# Patient Record
Sex: FEMALE | Race: WHITE | NOT HISPANIC OR LATINO | Employment: FULL TIME | URBAN - METROPOLITAN AREA
[De-identification: names, ages, dates, MRNs, and addresses within clinical notes are randomized per-mention and may not be internally consistent; named-entity substitution may affect disease eponyms.]

---

## 2021-03-05 ENCOUNTER — NON-PROVIDER VISIT (OUTPATIENT)
Dept: OCCUPATIONAL MEDICINE | Facility: CLINIC | Age: 31
End: 2021-03-05

## 2021-03-05 DIAGNOSIS — Z11.1 ENCOUNTER FOR PPD TEST: Primary | ICD-10-CM

## 2021-03-05 PROCEDURE — 86580 TB INTRADERMAL TEST: CPT | Performed by: NURSE PRACTITIONER

## 2021-03-08 ENCOUNTER — NON-PROVIDER VISIT (OUTPATIENT)
Dept: OCCUPATIONAL MEDICINE | Facility: CLINIC | Age: 31
End: 2021-03-08

## 2021-03-08 DIAGNOSIS — R76.11 POSITIVE PPD: ICD-10-CM

## 2021-03-08 LAB — TB WHEAL 3D P 5 TU DIAM: NORMAL MM

## 2021-03-08 NOTE — NON-PROVIDER
Patient was sent to [facility] for CXR, was explained that she needs to come back for her clearance and her copies once she gets the imaging report. Patient understood and left w/o any questions.

## 2021-05-26 ENCOUNTER — APPOINTMENT (OUTPATIENT)
Dept: RADIOLOGY | Facility: IMAGING CENTER | Age: 31
End: 2021-05-26
Attending: PHYSICIAN ASSISTANT
Payer: COMMERCIAL

## 2021-05-26 ENCOUNTER — OCCUPATIONAL MEDICINE (OUTPATIENT)
Dept: URGENT CARE | Facility: PHYSICIAN GROUP | Age: 31
End: 2021-05-26
Payer: COMMERCIAL

## 2021-05-26 VITALS
HEART RATE: 90 BPM | BODY MASS INDEX: 29.77 KG/M2 | TEMPERATURE: 97.7 F | OXYGEN SATURATION: 99 % | HEIGHT: 63 IN | DIASTOLIC BLOOD PRESSURE: 74 MMHG | SYSTOLIC BLOOD PRESSURE: 110 MMHG | RESPIRATION RATE: 18 BRPM | WEIGHT: 168 LBS

## 2021-05-26 DIAGNOSIS — S93.401A SPRAIN OF RIGHT ANKLE, UNSPECIFIED LIGAMENT, INITIAL ENCOUNTER: ICD-10-CM

## 2021-05-26 DIAGNOSIS — S99.911A INJURY OF RIGHT ANKLE, INITIAL ENCOUNTER: ICD-10-CM

## 2021-05-26 PROCEDURE — 73610 X-RAY EXAM OF ANKLE: CPT | Mod: TC,RT | Performed by: PHYSICIAN ASSISTANT

## 2021-05-26 PROCEDURE — 99203 OFFICE O/P NEW LOW 30 MIN: CPT | Performed by: PHYSICIAN ASSISTANT

## 2021-05-26 ASSESSMENT — ENCOUNTER SYMPTOMS
FEVER: 0
SENSORY CHANGE: 0
PALPITATIONS: 0
SHORTNESS OF BREATH: 0
BLURRED VISION: 0
TINGLING: 0
VOMITING: 0
CHILLS: 0
SORE THROAT: 0
NAUSEA: 0

## 2021-05-26 NOTE — LETTER
[facility]  [address] [place] [address]. [address] - [place] [address]  Phone:  [phone] - Fax:  [phone]   [facility] Progress Report and Disability Certification  Date of Service: 5/26/2021   No Show:  No  Date / Time of Next Visit: 6/1/2021   Claim Information   Patient Name: Shahid Stock  Claim Number:     Employer:   [employer]  Date of Injury: 5/24/2021     Insurer / TPA:   [employer] / Shanna Echevarria  ID / SSN: [id number]   Occupation:   Diagnosis: Diagnoses of Injury of right ankle, initial encounter and Sprain of right ankle, unspecified ligament, initial encounter were pertinent to this visit.    Medical Information   Related to Industrial Injury? Yes    Subjective Complaints:  DOI 5/24/2021:    Shahid Stock is a 30 y.o. female who presents today for evaluation of right ankle/foot pain.  2 days ago patient was carrying a box in her arms.  She did not see another box that was in front of her and she tripped over it falling to the ground.  As she fell her right foot twisted underneath her.  She has had pain and swelling of her right foot/ankle since that time.  Pain is worse with ambulation and if she stands for more than 20 minutes at a time.  She has not had any numbness or tingling.  Denies any previous injury.  She has used OTC analgesics and has occasionally used crutches but symptoms have not improved.     Objective Findings: Musculoskeletal:      Right ankle: Swelling and ecchymosis present. No deformity. Tenderness present over the lateral malleolus, ATF ligament, CF ligament and base of 5th metatarsal. No medial malleolus or proximal fibula tenderness. Decreased range of motion.      Right Achilles Tendon: Normal.      Comments: DP pulses are 2+ and symmetric bilaterally.  Sensation intact.  Cap refill less than 2 seconds.  5/5 strength with resisted motions but this does cause pain.  Antalgic gait.      Pre-Existing  Condition(s):     Assessment:   Initial Visit    Status: Additional Care Required  Permanent Disability:No    Plan:      Diagnostics: X-ray    Comments:       Disability Information   Status: Released to Restricted Duty    From:  2021  Through: 2021 Restrictions are: Temporary   Physical Restrictions   Sitting:    Standin hrs/day Stooping:    Bending:      Squatting:    Walkin hrs/day Climbing:    Pushing:      Pulling:    Other:    Reaching Above Shoulder (L):   Reaching Above Shoulder (R):       Reaching Below Shoulder (L):    Reaching Below Shoulder (R):      Not to exceed Weight Limits   Carrying(hrs):   Weight Limit(lb):   Lifting(hrs):   Weight  Limit(lb):     Comments: -Work restrictions: Patient must wear the walking boot while at work and should only do seated activities.  Return on Tuesday next week for reevaluation.  Return sooner for new or worsening symptoms.    Repetitive Actions   Hands: i.e. Fine Manipulations from Grasping:     Feet: i.e. Operating Foot Controls:     Driving / Operate Machinery:     Provider Name:   Anabelle Carlton P.A.-C. Physician Signature:  Physician Name:     Clinic Name / Location: [address]  [place] [address] Clinic Phone Number: Dept: [phone]   Appointment Time: 5:00 Pm Visit Start Time: 5:18 PM   Check-In Time:  5:15 Pm Visit Discharge Time:  6:36PM   Original-Treating Physician or Chiropractor    Page 2-Insurer/TPA    Page 3-Employer    Page 4-Employee

## 2021-05-26 NOTE — LETTER
"EMPLOYEE’S CLAIM FOR COMPENSATION/ REPORT OF INITIAL TREATMENT  FORM C-4    EMPLOYEE’S CLAIM - PROVIDE ALL INFORMATION REQUESTED   First Name  Shahid Last Name  Dayami Birthdate                    1990                Sex  female Claim Number   Home Address  [address] Age  30 y.o. Height  1.6 m (5' 3\") Weight  76.2 kg (168 lb) [facility]     [facility] Zip  [address] Telephone  [phone] (home)    Mailing Address  [address] [place]  [address] Primary Language Spoken  English    Insurer   Third Party    [employer] / [employer]  Employee's Occupation (Job Title) When Injury or Occupational Disease Occurred      Employer's Name    [employer] Telephone  [phone]    Employer Address  [address] [place]  [address]    Date of Injury  5/24/2021               Hour of Injury  4:20 PM Date Employer Notified  5/24/2021 Last Day of Work after Injury     or Occupational Disease  5/24/2021 Supervisor to Whom Injury     Reported  /[facility]   Address or Location of Accident (if applicable)  Work [1]   What were you doing at the time of accident? (if applicable)  Aranging Boxes    How did this injury or occupational disease occur? (Be specific an answer in detail. Use additional sheet if necessary)  I Tripped Myself at the Box   If you believe that you have an occupational disease, when did you first have knowledge of the disability and it relationship to your employment?  None Witnesses to the Accident  [facility]/      Nature of Injury or Occupational Disease  Sprain  Part(s) of Body Injured or Affected  Foot (R), N/A, N/A    I certify that the above is true and correct to the best of my knowledge and that I have provided this information in order to obtain the benefits of [place] inclusive or [place]).  I hereby authorize any physician, chiropractor, surgeon, practitioner, or other person, any hospital, including [facility] or [facility] hospital, any medical service organization, any insurance company, or other institution or organization to release to each other, any medical or other information, including benefits paid or payable, pertinent to this injury or disease, except information relative to diagnosis, treatment and/or counseling for AIDS, psychological conditions, alcohol or controlled substances, for which I must give specific authorization.  A Photostat of this authorization shall be as valid as the original.     Date05/26/2021   [facility]    Employee’s Signature   THIS REPORT MUST BE COMPLETED AND MAILED WITHIN 3 WORKING DAYS OF TREATMENT   [facility]  Name of Facility  [place]   Date  5/26/2021 Diagnosis  (S99.911A) Injury of right ankle, initial encounter  (S93.401A) Sprain of right ankle, unspecified ligament, initial encounter Is there evidence the injured employee was under the              influence of alcohol and/or another controlled substance at the time of accident?   Hour  5:18 PM Description of Injury or Disease  Diagnoses of Injury of right ankle, initial encounter and Sprain of right ankle, unspecified ligament, initial encounter were pertinent to this visit. No   Treatment  - Tall walking boot was provided.  Patient already has crutches.  She was advised to be weightbearing as tolerated.  - Apply ice multiple times per day  - OTC NSAIDs  - Keep elevated as much as possible  Have you advised the patient to remain off work five days or     more? No   X-Ray Findings  Negative   If Yes   From Date  To Date      From information given by the employee, together with medical evidence, can you directly connect this injury or occupational disease as job incurred?  Yes If No Full Duty    No Modified  "Duty  Yes   Is additional medical care by a physician indicated?  Yes If Modified Duty, Specify any Limitations / Restrictions      Do you know of any previous injury or disease contributing to this condition or occupational disease?                            No   Date  5/26/2021 Print Doctor’s Name   Audrey Carlton P.A.-C. I certify the employer’s copy of  this form was mailed on:   Address  [address] [place] [address]. [address] Insurer’s Use Only     [facility] Zip  [address]    Provider’s Tax ID Number  [id number] Telephone  Dept: [phone]      e-AUDREY Castillo P.A.-C.  Signature:     Degree          ORIGINAL-TREATING PHYSICIAN OR CHIROPRACTOR    PAGE 2-INSURER/TPA    PAGE 3-EMPLOYER    PAGE 4-EMPLOYEE        Form C-4 (rev.10/07)           BRIEF DESCRIPTION OF RIGHTS AND BENEFITS  (Pursuant to [place] 616C.050)    Notice of Injury or Occupational Disease (Incident Report Form C-1): If an injury or occupational disease (OD) arises out of and in the course of employment, you must provide written notice to your employer as soon as practicable, but no later than 7 days after the accident or OD. Your employer shall maintain a sufficient supply of the required forms.    Claim for Compensation (Form C-4): If medical treatment is sought, the form C-4 is available at the place of initial treatment. A completed \"Claim for Compensation\" (Form C-4) must be filed within 90 days after an accident or OD. The treating physician or chiropractor must, within 3 working days after treatment, complete and mail to the employer, the employer's insurer and third-party , the Claim for Compensation.    Medical Treatment: If you require medical treatment for your on-the-job injury or OD, you may be required to select a physician or chiropractor from a list provided by your workers’ compensation insurer, if it has contracted with an Organization for Managed Care (MCO) or Preferred Provider Organization (PPO) or " providers of health care. If your employer has not entered into a contract with an MCO or PPO, you may select a physician or chiropractor from the Panel of Physicians and Chiropractors. Any medical costs related to your industrial injury or OD will be paid by your insurer.    Temporary Total Disability (TTD): If your doctor has certified that you are unable to work for a period of at least 5 consecutive days, or 5 cumulative days in a 20-day period, or places restrictions on you that your employer does not accommodate, you may be entitled to TTD compensation.    Temporary Partial Disability (TPD): If the wage you receive upon reemployment is less than the compensation for TTD to which you are entitled, the insurer may be required to pay you TPD compensation to make up the difference. TPD can only be paid for a maximum of 24 months.    Permanent Partial Disability (PPD): When your medical condition is stable and there is an indication of a PPD as a result of your injury or OD, within 30 days, your insurer must arrange for an evaluation by a rating physician or chiropractor to determine the degree of your PPD. The amount of your PPD award depends on the date of injury, the results of the PPD evaluation, your age and wage.    Permanent Total Disability (PTD): If you are medically certified by a treating physician or chiropractor as permanently and totally disabled and have been granted a PTD status by your insurer, you are entitled to receive monthly benefits not to exceed 66 2/3% of your average monthly wage. The amount of your PTD payments is subject to reduction if you previously received a lump-sum PPD award.    Vocational Rehabilitation Services: You may be eligible for vocational rehabilitation services if you are unable to return to the job due to a permanent physical impairment or permanent restrictions as a result of your injury or occupational disease.    Transportation and Per Lola Reimbursement: You may be  eligible for travel expenses and per suzanna associated with medical treatment.    Reopening: You may be able to reopen your claim if your condition worsens after claim closure.     Appeal Process: If you disagree with a written determination issued by the insurer or the insurer does not respond to your request, you may appeal to the Department of Administration, , by following the instructions contained in your determination letter. You must appeal the determination within 70 days from the date of the determination letter at [address], [address], [place] [address], or [address], [place], [place] [address]. If you disagree with the  decision, you may appeal to the Department of Administration, . You must file your appeal within 30 days from the date of the  decision letter at [address], [address], [place], [place] [address], or [address] [facility] [address] [address], [place], [place] [address]. If you disagree with a decision of an , you may file a petition for judicial review with the District Court. You must do so within 30 days of the Appeal Officer’s decision. You may be represented by an  at your own expense or you may contact the [facility] for possible representation.    [place]  for Injured Workers (NAIW): If you disagree with a  decision, you may request that NAIW represent you without charge at an  Hearing. For information regarding denial of benefits, you may contact the [facility] at: [address], [address], [place] [address], [phone], or [address] [address], [phone]    To File a Complaint with the Division: If you wish to file a complaint with the  of the Division of Industrial Relations (DIR),  please contact the Workers’ Compensation Section, [address], [facility] [address], [place]  [place], [place] [address], telephone [phone], or [address], [address], [place], [place] [address], telephone [phone].    For assistance with Workers’ Compensation Issues: You may contact the [facility] Office for Consumer Health Assistance, [address], [address], [place] [address], Toll Free 1-307.330.5693, Web site: http://[facility].[place].gov/Programs/GAGE E-mail: [email or web address].[email or web address].[facility]              __________________________________________________________________                                    _________________            Employee Name / Signature                                                                                                                            Date                                                                                                                                                                                                                              D-2 (rev. 10/20)

## 2021-05-27 NOTE — PROGRESS NOTES
"Subjective:      Shahid Stock is a 30 y.o. female who presents with Work-Related Injury (DOI 5/24/21, right foot possible sprain. patient tripped on a box while hold a box, she twisted her right ankle. )      DOI 5/24/2021:    Shahid Stock is a 30 y.o. female who presents today for evaluation of right ankle/foot pain.  2 days ago patient was carrying a box in her arms.  She did not see another box that was in front of her and she tripped over it falling to the ground.  As she fell her right foot twisted underneath her.  She has had pain and swelling of her right foot/ankle since that time.  Pain is worse with ambulation and if she stands for more than 20 minutes at a time.  She has not had any numbness or tingling.  Denies any previous injury.  She has used OTC analgesics and has occasionally used crutches but symptoms have not improved.        Review of Systems   Constitutional: Negative for chills and fever.   HENT: Negative for sore throat.    Eyes: Negative for blurred vision.   Respiratory: Negative for shortness of breath.    Cardiovascular: Negative for chest pain and palpitations.   Gastrointestinal: Negative for nausea and vomiting.   Musculoskeletal: Positive for joint pain (right ankle).   Neurological: Negative for tingling and sensory change.     PMH: No pertinent past medical history to this problem  MEDS: Medications were reviewed in Epic  ALLERGIES: Allergies were reviewed in Epic  FH: No pertinent family history to this problem         Objective:     /74   Pulse 90   Temp 36.5 °C (97.7 °F) (Temporal)   Resp 18   Ht 1.6 m (5' 3\")   Wt 76.2 kg (168 lb)   SpO2 99%   BMI 29.76 kg/m²      Physical Exam  Constitutional:       Appearance: She is well-developed.   HENT:      Head: Normocephalic and atraumatic.      Right Ear: External ear normal.      Left Ear: External ear normal.   Eyes:      Conjunctiva/sclera: Conjunctivae normal.      Pupils: Pupils are equal, round, and " reactive to light.   Cardiovascular:      Rate and Rhythm: Normal rate and regular rhythm.      Heart sounds: Normal heart sounds. No murmur heard.     Pulmonary:      Effort: Pulmonary effort is normal.      Breath sounds: Normal breath sounds. No wheezing.   Musculoskeletal:      Right ankle: Swelling and ecchymosis present. No deformity. Tenderness present over the lateral malleolus, ATF ligament, CF ligament and base of 5th metatarsal. No medial malleolus or proximal fibula tenderness. Decreased range of motion.      Right Achilles Tendon: Normal.      Comments: DP pulses are 2+ and symmetric bilaterally.  Sensation intact.  Cap refill less than 2 seconds.  5/5 strength with resisted motions but this does cause pain.  Antalgic gait.   Skin:     General: Skin is warm and dry.      Capillary Refill: Capillary refill takes less than 2 seconds.   Neurological:      Mental Status: She is alert and oriented to person, place, and time.   Psychiatric:         Behavior: Behavior normal.         Judgment: Judgment normal.       DX-ANKLE 3+ VIEWS RIGHT  FINDINGS:  The alignment of the ankle is normal.  There is no soft tissue swelling.  There is no evidence of displaced fracture or dislocation.        IMPRESSION:     Negative RIGHT ankle series.                     *X-rays were reviewed and interpreted independently by me. I agree with the radiologist's findings          Assessment/Plan:     1. Injury of right ankle, initial encounter  - DX-ANKLE 3+ VIEWS RIGHT; Future    2. Sprain of right ankle, unspecified ligament, initial encounter  - Tall walking boot was provided.  Patient already has crutches.  She was advised to be weightbearing as tolerated.  - Apply ice multiple times per day  - OTC NSAIDs  - Keep elevated as much as possible  -Work restrictions: Patient must wear the walking boot while at work and should only do seated activities.  Return on Tuesday next week for reevaluation.  Return sooner for new or  worsening symptoms.

## 2021-06-01 ENCOUNTER — OCCUPATIONAL MEDICINE (OUTPATIENT)
Dept: URGENT CARE | Facility: PHYSICIAN GROUP | Age: 31
End: 2021-06-01
Payer: COMMERCIAL

## 2021-06-01 VITALS
OXYGEN SATURATION: 97 % | HEART RATE: 107 BPM | WEIGHT: 168 LBS | SYSTOLIC BLOOD PRESSURE: 118 MMHG | DIASTOLIC BLOOD PRESSURE: 78 MMHG | HEIGHT: 63 IN | BODY MASS INDEX: 29.77 KG/M2 | RESPIRATION RATE: 16 BRPM | TEMPERATURE: 99.7 F

## 2021-06-01 DIAGNOSIS — S93.401D SPRAIN OF RIGHT ANKLE, UNSPECIFIED LIGAMENT, SUBSEQUENT ENCOUNTER: ICD-10-CM

## 2021-06-01 PROCEDURE — 99213 OFFICE O/P EST LOW 20 MIN: CPT | Performed by: NURSE PRACTITIONER

## 2021-06-01 ASSESSMENT — ENCOUNTER SYMPTOMS
NEUROLOGICAL NEGATIVE: 1
CONSTITUTIONAL NEGATIVE: 1

## 2021-06-01 ASSESSMENT — VISUAL ACUITY: OU: 1

## 2021-06-01 NOTE — PROGRESS NOTES
"Subjective:     Shahid Stock is a 30 y.o. female who presents for Ankle Injury (WC FV, R ankle injury, painful to walk, swelling, pt states its not feeling better )    \"DOI 5/24/2021:     Shahid Stock is a 30 y.o. female who presents today for evaluation of right ankle/foot pain.  2 days ago patient was carrying a box in her arms.  She did not see another box that was in front of her and she tripped over it falling to the ground.  As she fell her right foot twisted underneath her.  She has had pain and swelling of her right foot/ankle since that time.  Pain is worse with ambulation and if she stands for more than 20 minutes at a time.  She has not had any numbness or tingling.  Denies any previous injury.  She has used OTC analgesics and has occasionally used crutches but symptoms have not improved.\"    X-ray of right ankle negative. Tx included tall walking boot. Had crutches. RICE/NSAIDs. Restrictions.    Follow-up  visit today 6/1/2021:    Patient continues to report pain and tenderness at the front of her right ankle. Patient reports being able to walk lightly at home for up to 30 minutes. Has been using ibuprofen as needed. Has not been back to work yet.    Patient was screened prior to rooming and denied COVID-19 diagnosis or contact with a person who has been diagnosed or is suspected to have COVID-19. During this visit, appropriate PPE was worn, hand hygiene was performed, and the patient and any visitors were masked.    PMH: No pertinent past medical history to this problem  MEDS: Medications were reviewed in Epic  ALLERGIES: Allergies were reviewed in Epic  SOCHX: Works as a  at [employer]   FH: No pertinent family history to this problem    Review of Systems   Constitutional: Negative.    Musculoskeletal:        R ankle pain   Neurological: Negative.    All other systems reviewed and are negative.    Additional details per HPI.      Objective:     /78 (BP Location: Right arm, " "Patient Position: Sitting, BP Cuff Size: Adult)   Pulse (!) 107   Temp 37.6 °C (99.7 °F) (Temporal)   Resp 16   Ht 1.6 m (5' 3\")   Wt 76.2 kg (168 lb)   SpO2 97%   BMI 29.76 kg/m²     Physical Exam  Vitals reviewed.   Constitutional:       General: She is not in acute distress.     Appearance: She is well-developed. She is not ill-appearing or toxic-appearing.   HENT:      Head: Normocephalic.      Right Ear: External ear normal.      Left Ear: External ear normal.   Eyes:      General: Vision grossly intact.      Extraocular Movements: Extraocular movements intact.      Conjunctiva/sclera: Conjunctivae normal.   Cardiovascular:      Rate and Rhythm: Normal rate.      Pulses: Normal pulses.   Pulmonary:      Effort: Pulmonary effort is normal. No respiratory distress.   Musculoskeletal:         General: No deformity.      Cervical back: Normal range of motion.      Right ankle: Swelling present. No deformity, ecchymosis or lacerations. Tenderness present. Decreased range of motion.      Right foot: Normal range of motion and normal capillary refill. No swelling, deformity, laceration or tenderness.      Comments: Mild swelling, tenderness over anterolateral aspect of right ankle   Skin:     General: Skin is warm and dry.      Capillary Refill: Capillary refill takes less than 2 seconds.      Coloration: Skin is not pale.      Findings: No bruising, erythema or rash.   Neurological:      Mental Status: She is alert and oriented to person, place, and time.      Sensory: No sensory deficit.      Motor: No weakness.      Coordination: Coordination normal.      Gait: Gait abnormal (Mildly antalgic).   Psychiatric:         Behavior: Behavior normal. Behavior is cooperative.       Assessment/Plan:     1. Sprain of right ankle, unspecified ligament, subsequent encounter    Rest, ice, compression, and elevation (RICE) and over-the-counter acetaminophen alternating with ibuprofen, per 's instructions, as " needed for pain. Continue with walking boot/crutches as needed. Ambulate as tolerated. Work restrictions. Follow up in 5 days. If no significant improvement, consider referral to occupational medicine. Return sooner if symptoms change/worsen.    Differential diagnosis, natural history, supportive care, over-the-counter symptom management per 's instructions, close monitoring, and indications for immediate follow-up discussed.     Patient advised to: Return in about 5 days (around 6/6/2021).    All questions answered. Patient agrees with the plan of care.

## 2021-06-01 NOTE — LETTER
"   [facility] [address] - [place] [address]  Phone:  [phone] - Fax:  [phone]   [facility] Progress Report and Disability Certification  Date of Service: 6/1/2021   No Show:  No  Date / Time of Next Visit: 6/6/2021@ 9:00 AM   Claim Information   Patient Name: Shahid Stock  Claim Number:     Employer:   [employer] Date of Injury: 5/24/2021     Insurer / TPA: Andi [place]  ID / SSN:     Occupation:   Diagnosis: The encounter diagnosis was Sprain of right ankle, unspecified ligament, subsequent encounter.    Medical Information   Related to Industrial Injury? Yes    Subjective Complaints:  \"DOI 5/24/2021:     Shahid Stock is a 30 y.o. female who presents today for evaluation of right ankle/foot pain.  2 days ago patient was carrying a box in her arms.  She did not see another box that was in front of her and she tripped over it falling to the ground.  As she fell her right foot twisted underneath her.  She has had pain and swelling of her right foot/ankle since that time.  Pain is worse with ambulation and if she stands for more than 20 minutes at a time.  She has not had any numbness or tingling.  Denies any previous injury.  She has used OTC analgesics and has occasionally used crutches but symptoms have not improved.\"    X-ray of right ankle negative. Tx included tall walking boot. Had crutches. RICE/NSAIDs. Restrictions.    Follow-up  visit today 6/1/2021:    Patient continues to report pain and tenderness at the front of her right ankle. Patient reports being able to walk lightly at home for up to 30 minutes. Has been using ibuprofen as needed. Has not been back to work yet.   Objective Findings: Constitutional:       General: She is not in acute distress.     Appearance: She is well-developed. She is not ill-appearing or toxic-appearing.   Musculoskeletal:         General: No deformity.      Cervical back: " Normal range of motion.      Right ankle: Swelling present. No deformity, ecchymosis or lacerations. Tenderness present. Decreased range of motion.      Right foot: Normal range of motion and normal capillary refill. No swelling, deformity, laceration or tenderness.      Comments: Mild swelling, tenderness over anterolateral aspect of right ankle   Skin:     General: Skin is warm and dry.      Capillary Refill: Capillary refill takes less than 2 seconds.      Coloration: Skin is not pale.      Findings: No bruising, erythema or rash.   Neurological:      Mental Status: She is alert and oriented to person, place, and time.      Sensory: No sensory deficit.      Motor: No weakness.      Coordination: Coordination normal.      Gait: Gait abnormal (Mildly antalgic).   Pre-Existing Condition(s):     Assessment:     Comments:Slightly improved    Status: Additional Care Required  Permanent Disability:No    Plan:      Diagnostics:      Comments:  Rest, ice, compression, and elevation (RICE) and over-the-counter acetaminophen alternating with ibuprofen, per 's instructions, as needed for pain. Continue with walking boot/crutches as needed. Ambulate as tolerated. Work restrictions.   Follow up in 5 days. If no significant improvement, consider referral to occupational medicine. Return sooner if symptoms change/worsen.    Disability Information   Status: Released to Restricted Duty    From:  6/1/2021  Through: 6/6/2021 Restrictions are: Temporary   Physical Restrictions   Sitting:    Standing:  < or = to 1 hr/day Stooping:    Bending:      Squatting:    Walking:  < or = to 1 hr/day Climbing:    Pushing:      Pulling:    Other:    Reaching Above Shoulder (L):   Reaching Above Shoulder (R):       Reaching Below Shoulder (L):    Reaching Below Shoulder (R):      Not to exceed Weight Limits   Carrying(hrs):   Weight Limit(lb): < or = to 10 pounds Lifting(hrs):   Weight  Limit(lb): < or = to 10 pounds   Comments: Must  wear walking boot at work; prefer seated activities    Repetitive Actions   Hands: i.e. Fine Manipulations from Grasping:     Feet: i.e. Operating Foot Controls:     Driving / Operate Machinery:     Provider Name:   LICO Kaplan Physician Signature:  Physician Name:     Clinic Name / Location: [address]  [place] [address] Clinic Phone Number: Dept: [phone]   Appointment Time: 12:55 Pm Visit Start Time: 1:00 PM   Check-In Time:  12:55 Pm Visit Discharge Time: 1:25 PM   Original-Treating Physician or Chiropractor    Page 2-Insurer/TPA    Page 3-Employer    Page 4-Employee

## 2021-06-06 ENCOUNTER — OCCUPATIONAL MEDICINE (OUTPATIENT)
Dept: URGENT CARE | Facility: PHYSICIAN GROUP | Age: 31
End: 2021-06-06
Payer: COMMERCIAL

## 2021-06-06 VITALS
DIASTOLIC BLOOD PRESSURE: 72 MMHG | WEIGHT: 168 LBS | SYSTOLIC BLOOD PRESSURE: 116 MMHG | HEART RATE: 84 BPM | HEIGHT: 63 IN | TEMPERATURE: 97.4 F | BODY MASS INDEX: 29.77 KG/M2 | OXYGEN SATURATION: 98 % | RESPIRATION RATE: 16 BRPM

## 2021-06-06 DIAGNOSIS — S93.401D SPRAIN OF RIGHT ANKLE, UNSPECIFIED LIGAMENT, SUBSEQUENT ENCOUNTER: ICD-10-CM

## 2021-06-06 PROCEDURE — 99213 OFFICE O/P EST LOW 20 MIN: CPT | Performed by: PHYSICIAN ASSISTANT

## 2021-06-06 ASSESSMENT — ENCOUNTER SYMPTOMS
SENSORY CHANGE: 0
FOCAL WEAKNESS: 0
TINGLING: 0

## 2021-06-06 NOTE — LETTER
"   [facility] [address] - [place] [address]  Phone:  [phone] - Fax:  [phone]   [facility] Progress Report and Disability Certification  Date of Service: 6/6/2021   No Show:  No  Date / Time of Next Visit: 6/11/2021@9:20AM   Claim Information   Patient Name: Shahid Jenkins  Claim Number:     Employer:   [employer]  Date of Injury: 5/24/2021     Insurer / TPA: Andi [place]  ID / SSN:     Occupation:   Diagnosis: The encounter diagnosis was Sprain of right ankle, unspecified ligament, subsequent encounter.    Medical Information   Related to Industrial Injury? Yes    Subjective Complaints:  The following is cut and pasted from first visit for continuity of documentation:  \"DOI 5/24/2021:  Shahid Stock is a 30 y.o. female who presents today for evaluation of right ankle/foot pain.  2 days ago patient was carrying a box in her arms.  She did not see another box that was in front of her and she tripped over it falling to the ground.  As she fell her right foot twisted underneath her.  She has had pain and swelling of her right foot/ankle since that time.  Pain is worse with ambulation and if she stands for more than 20 minutes at a time.  She has not had any numbness or tingling.  Denies any previous injury.  She has used OTC analgesics and has occasionally used crutches but symptoms have not improved.\"     X-ray of right ankle negative. Tx included tall walking boot. Had crutches. RICE/NSAIDs. Restrictions.    This is patient 3rd visit for injury.  Pt states her foot and ankle are still sore and swollen, she continues to wear walking boot, RICE treatment and is taking otc ibuprofen \" without any change\" .  Pt has not yet returned to work, she states there is no light duty or seated work for her at her job at [employer].      Objective Findings: Right foot exam: Dorsum of foot is moderately swollen, lateral " malleolus moderately tender to palpation, lateral aspect of foot is tender to palpation, arch of foot is also tender to palpation.  No tenderness to palpation to medial malleolus.  Patient has patient has painful decreased range of motion.  Patient ambulates with antalgic gait.  Distal neurovascular intact.   Pre-Existing Condition(s):     Assessment:   Condition Same    Status: Additional Care Required  Permanent Disability:No    Plan:      Diagnostics:   Comments:xray on initial visit was negative    Comments:       Disability Information   Status:      From:  2021  Through: 2021 Restrictions are: Temporary   Physical Restrictions   Sitting:    Standing:  < or = to 1 hr/day Stooping:    Bending:      Squattin hrs/day Walking:  < or = to 1 hr/day Climbin hrs/day Pushing:      Pulling:    Other:    Reaching Above Shoulder (L):   Reaching Above Shoulder (R):       Reaching Below Shoulder (L):    Reaching Below Shoulder (R):      Not to exceed Weight Limits   Carrying(hrs):   Weight Limit(lb): < or = to 10 pounds Lifting(hrs):   Weight  Limit(lb): < or = to 10 pounds   Comments: Patient must wear walking boot while at work.  Prefer seated work.  Continue RICE treatment, over-the-counter NSAIDs as discussed.  Patient to continue doing gentle range of motion exercises several times daily.    Repetitive Actions   Hands: i.e. Fine Manipulations from Grasping:     Feet: i.e. Operating Foot Controls:     Driving / Operate Machinery:     Provider Name:   Hannah Acosta P.A.-C. Physician Signature:  Physician Name:     Clinic Name / Location: [address]  [place] [address] Clinic Phone Number: Dept: [phone]   Appointment Time: 9:45 Am Visit Start Time: 9:45 AM   Check-In Time:  9:44 Am Visit Discharge Time:     Original-Treating Physician or Chiropractor    Page 2-Insurer/TPA    Page 3-Employer    Page 4-Employee

## 2021-06-06 NOTE — PROGRESS NOTES
"Subjective:      Shahid Jenkins is a 30 y.o. female who presents with Ankle Injury (WC FV, Still in pain, swelling has gone down )    Medications:    • This patient does not have an active medication from one of the medication groupers.    Allergies: Patient has no known allergies.    Problem List: Shahid Jenkins does not have a problem list on file.    Surgical History:  No past surgical history on file.    Past Social Hx: Shahid Jenkins  reports that she has been smoking. She has never used smokeless tobacco. She reports that she does not drink alcohol and does not use drugs.     Past Family Hx:  Shahid Jenkins family history is not on file.     Problem list, medications, and allergies reviewed by myself today in Epic.    The following is cut and pasted from first visit for continuity of documentation:  \"DOI 5/24/2021:  Shahid Stock is a 30 y.o. female who presents today for evaluation of right ankle/foot pain.  2 days ago patient was carrying a box in her arms.  She did not see another box that was in front of her and she tripped over it falling to the ground.  As she fell her right foot twisted underneath her.  She has had pain and swelling of her right foot/ankle since that time.  Pain is worse with ambulation and if she stands for more than 20 minutes at a time.  She has not had any numbness or tingling.  Denies any previous injury.  She has used OTC analgesics and has occasionally used crutches but symptoms have not improved.\"     X-ray of right ankle negative. Tx included tall walking boot. Had crutches. RICE/NSAIDs. Restrictions.    This is patient 3rd visit for injury.  Pt states her foot and ankle are still sore and swollen, she continues to wear walking boot, RICE treatment and is taking otc ibuprofen \" without any change\" .  Pt has not yet returned to work, she states there is no light duty or seated work for her at her job at [employer].        Ankle Injury " "  Pertinent negatives include no tingling.       Review of Systems   Musculoskeletal: Positive for joint pain.   Skin: Negative for rash.   Neurological: Negative for tingling, sensory change and focal weakness.   All other systems reviewed and are negative.         Objective:     /72 (BP Location: Right arm, Patient Position: Sitting, BP Cuff Size: Adult)   Pulse 84   Temp 36.3 °C (97.4 °F) (Temporal)   Resp 16   Ht 1.6 m (5' 3\")   Wt 76.2 kg (168 lb)   SpO2 98%   BMI 29.76 kg/m²      Physical Exam  Vitals and nursing note reviewed.   Constitutional:       Appearance: Normal appearance. She is normal weight.   HENT:      Head: Normocephalic.      Nose: Nose normal.   Cardiovascular:      Rate and Rhythm: Normal rate.      Pulses: Normal pulses.   Pulmonary:      Effort: Pulmonary effort is normal.   Skin:     Capillary Refill: Capillary refill takes less than 2 seconds.   Neurological:      Mental Status: She is alert.         Right foot exam: Dorsum of foot is moderately swollen, lateral malleolus moderately tender to palpation, lateral aspect of foot is tender to palpation, arch of foot is also tender to palpation.  No tenderness to palpation to medial malleolus.  Patient has patient has painful decreased range of motion.  Patient ambulates with antalgic gait.  Distal neurovascular intact.       Assessment/Plan:         1. Sprain of right ankle, unspecified ligament, subsequent encounter       Pt to follow instructions on d-39.      RTC in 6 days as scheduled.     PT should follow up with PCP in 1-2 days for re-evaluation if symptoms have not improved.      Discussed red flags and reasons to return to UC or ED.      Pt and/or family verbalized understanding of diagnosis and follow up instructions and was offered informational handout on diagnosis.  PT discharged.     I have spent 30 minutes on the care of this patient.  This includes preparing for visit which includes review of previous visits if " available in EMR, obtaining HPI, exam and evaluation of patient, ordering and independent interpretation of labs, imaging, tests, medical management, counseling, education and documentation.

## 2021-06-11 ENCOUNTER — OCCUPATIONAL MEDICINE (OUTPATIENT)
Dept: URGENT CARE | Facility: PHYSICIAN GROUP | Age: 31
End: 2021-06-11
Payer: COMMERCIAL

## 2021-06-11 VITALS
HEART RATE: 101 BPM | TEMPERATURE: 97.7 F | WEIGHT: 168 LBS | DIASTOLIC BLOOD PRESSURE: 80 MMHG | HEIGHT: 63 IN | BODY MASS INDEX: 29.77 KG/M2 | SYSTOLIC BLOOD PRESSURE: 112 MMHG | OXYGEN SATURATION: 99 %

## 2021-06-11 DIAGNOSIS — S93.401D SPRAIN OF RIGHT ANKLE, UNSPECIFIED LIGAMENT, SUBSEQUENT ENCOUNTER: Primary | ICD-10-CM

## 2021-06-11 PROCEDURE — 99214 OFFICE O/P EST MOD 30 MIN: CPT | Performed by: PHYSICIAN ASSISTANT

## 2021-06-11 NOTE — LETTER
[address] - [place] [address]  Phone:  [phone] - Fax:  [phone]   [facility] Progress Report and Disability Certification  Date of Service: 6/11/2021   No Show:  No  Date / Time of Next Visit: 6/16/2021@9:00AM   Claim Information   Patient Name: Shahid Jenkins  Claim Number:     Employer:   [employer] Date of Injury: 5/24/2021     Insurer / TPA: Andi [place]  ID / SSN:     Occupation:   Diagnosis: The encounter diagnosis was Sprain of right ankle, unspecified ligament, subsequent encounter.    Medical Information   Related to Industrial Injury? Yes    Subjective Complaints:  Copied from initial visit - DOI 5/24/2021:     Shahid Stock is a 30 y.o. female who presents today for evaluation of right ankle/foot pain.  2 days ago patient was carrying a box in her arms.  She did not see another box that was in front of her and she tripped over it falling to the ground.  As she fell her right foot twisted underneath her.  She has had pain and swelling of her right foot/ankle since that time.  Pain is worse with ambulation and if she stands for more than 20 minutes at a time.  She has not had any numbness or tingling.  Denies any previous injury.  She has used OTC analgesics and has occasionally used crutches but symptoms have not improved.       F/u 6/11/21 visit #4- The pt return to work on Tuesday. She is taking 10-15 minutes breaks every 1-2 hours. Ankle pain has worsened since Tuesday. The pain is wore prolonged standing, walking. She has been wearing a non-weightbearing boot, elevating at rest and taking ibuprofen BID without improvement.    Objective Findings: Physical Exam  Vitals reviewed.   Constitutional:       General: She is not in acute distress.     Appearance: She is well-developed.   Neck:      Trachea: No tracheal deviation.   Musculoskeletal:      Right ankle: Tenderness present over the  lateral malleolus. No medial malleolus, base of 5th metatarsal or proximal fibula tenderness. Normal range of motion.      Right Achilles Tendon: Tenderness present. Clark's test negative.   Skin:     General: Skin is warm and dry.      Capillary Refill: Capillary refill takes less than 2 seconds.   Neurological:      Mental Status: She is alert and oriented to person, place, and time.   Psychiatric:         Mood and Affect: Mood normal.         Behavior: Behavior normal.    Pre-Existing Condition(s):     Assessment:   Condition Worsened    Status: Additional Care Required  Permanent Disability:No    Plan: Medication  Comments:ibuprofen TID     Diagnostics: X-ray  Comments:negative     Comments:       Disability Information   Status: Released to Restricted Duty    From:  6/11/2021  Through: 6/16/2021 Restrictions are: Temporary   Physical Restrictions   Sitting:    Standing:  < or = to 1 hr/day Stooping:    Bending:      Squatting:    Walking:  < or = to 1 hr/day Climbing:    Pushing:      Pulling:    Other:    Reaching Above Shoulder (L):   Reaching Above Shoulder (R):       Reaching Below Shoulder (L):    Reaching Below Shoulder (R):      Not to exceed Weight Limits   Carrying(hrs):   Weight Limit(lb):   Lifting(hrs):   Weight  Limit(lb):     Comments: Patient must wear walking boot while at work.  Prefer seated work.  Continue RICE treatment.   Patient to continue doing gentle range of motion exercises several times daily.   A referral to occ med was placed. Follow up in 5 days with occ med if unable to schedule she can return to .     Repetitive Actions   Hands: i.e. Fine Manipulations from Grasping:     Feet: i.e. Operating Foot Controls:     Driving / Operate Machinery:     Provider Name:   Rhonda Castillo P.A.-C. Physician Signature:  Physician Name:     Clinic Name / Location: [facility] [address]  [place] [address] Clinic Phone Number: Dept: [phone]    Appointment Time: 9:05 Am Visit Start Time: 9:03 AM   Check-In Time:  9:01 Am Visit Discharge Time: 9:48 AM    Original-Treating Physician or Chiropractor    Page 2-Insurer/TPA    Page 3-Employer    Page 4-Employee

## 2021-06-17 ENCOUNTER — OCCUPATIONAL MEDICINE (OUTPATIENT)
Dept: URGENT CARE | Facility: PHYSICIAN GROUP | Age: 31
End: 2021-06-17

## 2021-06-17 VITALS
DIASTOLIC BLOOD PRESSURE: 68 MMHG | BODY MASS INDEX: 29.41 KG/M2 | SYSTOLIC BLOOD PRESSURE: 120 MMHG | HEIGHT: 63 IN | RESPIRATION RATE: 12 BRPM | WEIGHT: 166 LBS | TEMPERATURE: 98.8 F | OXYGEN SATURATION: 96 % | HEART RATE: 105 BPM

## 2021-06-17 DIAGNOSIS — S93.401D SPRAIN OF RIGHT ANKLE, UNSPECIFIED LIGAMENT, SUBSEQUENT ENCOUNTER: ICD-10-CM

## 2021-06-17 PROCEDURE — 99212 OFFICE O/P EST SF 10 MIN: CPT | Performed by: PHYSICIAN ASSISTANT

## 2021-06-17 NOTE — LETTER
[facility]  [address] - [place] [address]  Phone:  [phone] - Fax:  [phone]   [facility] Progress Report and Disability Certification  Date of Service: 6/17/2021   No Show:  No  Date / Time of Next Visit: 6/25/2021@3:00PM @Occupational Health   Claim Information   Patient Name: Shahid Jenkins  Claim Number:     Employer:   [employer] Date of Injury: 5/24/2021     Insurer / TPA: Andi [place]  ID / SSN:     Occupation:   Diagnosis: The encounter diagnosis was Sprain of right ankle, unspecified ligament, subsequent encounter.    Medical Information   Related to Industrial Injury? Yes    Subjective Complaints:  Date of injury 5/24/2021: Patient returns today for her fifth urgent care visit, she was supposed to follow-up with occupational medicine but was unable to get this appointment scheduled apparently.  She reports that she is much better and she is tolerating her current work restrictions without any difficulty.  She is D escalated from the walking boot to a neoprene brace and is feeling much better.  She has not noticed any new numbness or tingling.  She is ready to expand her work duties.   Objective Findings: Alert nontoxic female no acute distress.  Neoprene brace.  Patient is ambulatory with a steady station gait.  Nontender over the bony proximal to the ankle.  No edema or deformity.  No ecchymosis.  No ligamentous laxity.   Pre-Existing Condition(s):     Assessment:   Condition Improved    Status: Discharged / Care Transfer  Permanent Disability:No    Plan:      Diagnostics:      Comments:  Previous x-rays reviewed, no evidence of fracture    Disability Information   Status: Released to Restricted Duty    From:  6/17/2021  Through: 6/26/2021 Restrictions are: Temporary   Physical Restrictions   Sitting:    Standing:    Stooping:    Bending:      Squatting:    Walking:    Climbing:    Pushing:       Pulling:    Other:    Reaching Above Shoulder (L):   Reaching Above Shoulder (R):       Reaching Below Shoulder (L):    Reaching Below Shoulder (R):      Not to exceed Weight Limits   Carrying(hrs):   Weight Limit(lb):   Lifting(hrs):   Weight  Limit(lb):     Comments: No standing more than 4 hours a day, no lifting more than 25 pounds.  Follow-up in 7 to 9 days with occupational medicine for final clearance.  Anticipate MMI at that time.    Repetitive Actions   Hands: i.e. Fine Manipulations from Grasping:     Feet: i.e. Operating Foot Controls:     Driving / Operate Machinery:     Provider Name:   Carrington Wiggins P.A.-C. Physician Signature:  Physician Name:     Clinic Name / Location: [address]  [place] [address] Clinic Phone Number: Dept: [phone]   Appointment Time: 5:20 Pm Visit Start Time: 5:23 PM   Check-In Time:  5:14 Pm Visit Discharge Time: 5:45 PM   Original-Treating Physician or Chiropractor    Page 2-Insurer/TPA    Page 3-Employer    Page 4-Employee

## 2021-06-18 NOTE — PROGRESS NOTES
"Subjective:     Shahid Jenkins is a 30 y.o. female who presents for Follow-Up (WC f/v right ankle feels better )      Date of injury 5/24/2021: Patient returns today for her fifth urgent care visit, she was supposed to follow-up with occupational medicine but was unable to get this appointment scheduled apparently.  She reports that she is much better and she is tolerating her current work restrictions without any difficulty.  She is D escalated from the walking boot to a neoprene brace and is feeling much better.  She has not noticed any new numbness or tingling.  She is ready to expand her work duties.    PMH:   No pertinent past medical history to this problem  MEDS:  Medications were reviewed in EMR  ALLERGIES:  Allergies were reviewed in EMR  SOCH[employer]:  Works as a   FH:   No pertinent family history to this problem       Objective:     /68 (BP Location: Right arm, Patient Position: Sitting, BP Cuff Size: Adult)   Pulse (!) 105   Temp 37.1 °C (98.8 °F) (Temporal)   Resp 12   Ht 1.6 m (5' 3\")   Wt 75.3 kg (166 lb)   SpO2 96%   BMI 29.41 kg/m²     Alert nontoxic female no acute distress.  Neoprene brace.  Patient is ambulatory with a steady station gait.  Nontender over the bony proximal to the ankle.  No edema or deformity.  No ecchymosis.  No ligamentous laxity.    Assessment/Plan:       1. Sprain of right ankle, unspecified ligament, subsequent encounter    • Released to Restricted Duty FROM 6/17/2021 TO 6/26/2021  No standing more than 4 hours a day, no lifting more than 25 pounds.  Follow-up in 7 to 9 days with occupational medicine for final clearance.  Anticipate MMI at that time.  Previous x-rays reviewed, no evidence of fracture    Differential diagnosis, natural history, supportive care, and indications for immediate follow-up discussed.    "

## 2021-11-01 ENCOUNTER — OCCUPATIONAL MEDICINE (OUTPATIENT)
Dept: URGENT CARE | Facility: PHYSICIAN GROUP | Age: 31
End: 2021-11-01
Payer: COMMERCIAL

## 2021-11-01 VITALS
DIASTOLIC BLOOD PRESSURE: 74 MMHG | TEMPERATURE: 97.9 F | HEIGHT: 63 IN | OXYGEN SATURATION: 99 % | HEART RATE: 64 BPM | RESPIRATION RATE: 16 BRPM | SYSTOLIC BLOOD PRESSURE: 110 MMHG | WEIGHT: 167.4 LBS | BODY MASS INDEX: 29.66 KG/M2

## 2021-11-01 DIAGNOSIS — B02.9 HERPES ZOSTER WITHOUT COMPLICATION: ICD-10-CM

## 2021-11-01 DIAGNOSIS — Z02.1 PRE-EMPLOYMENT DRUG SCREENING: ICD-10-CM

## 2021-11-01 DIAGNOSIS — R21 RASH: ICD-10-CM

## 2021-11-01 LAB
AMP AMPHETAMINE: NEGATIVE
COC COCAINE: NEGATIVE
INT CON NEG: NORMAL
INT CON POS: NORMAL
MET METHAMPHETAMINES: NEGATIVE
OPI OPIATES: NEGATIVE
PCP PHENCYCLIDINE: NEGATIVE
POC DRUG COMMENT 753798-OCCUPATIONAL HEALTH: NEGATIVE
THC: NEGATIVE

## 2021-11-01 PROCEDURE — 80305 DRUG TEST PRSMV DIR OPT OBS: CPT | Performed by: PHYSICIAN ASSISTANT

## 2021-11-01 PROCEDURE — 99213 OFFICE O/P EST LOW 20 MIN: CPT | Performed by: PHYSICIAN ASSISTANT

## 2021-11-01 RX ORDER — FAMCICLOVIR 500 MG/1
500 TABLET ORAL 3 TIMES DAILY
Qty: 21 TABLET | Refills: 0 | Status: SHIPPED | OUTPATIENT
Start: 2021-11-01 | End: 2021-11-08

## 2021-11-01 RX ORDER — NITROFURANTOIN 25; 75 MG/1; MG/1
CAPSULE ORAL
COMMUNITY
Start: 2021-09-21 | End: 2021-11-01

## 2021-11-01 ASSESSMENT — ENCOUNTER SYMPTOMS
MYALGIAS: 0
HEADACHES: 0
DIZZINESS: 0
FEVER: 0
NAUSEA: 0
CHILLS: 0
VOMITING: 0

## 2021-11-01 NOTE — LETTER
"   [facility] [address] - [place] [address]  Phone:  [phone] - Fax:  [phone]   [facility] Progress Report and Disability Certification  Date of Service: 11/1/2021   No Show:  No  Date / Time of Next Visit: 11/5/2021@9:00AM   Claim Information   Patient Name: Shahid Jenkins  Claim Number:     Employer: [employer]  Date of Injury: 10/31/2021     Insurer / TPA: Employers Insurance  ID / SSN:     Occupation: Caregiver  Diagnosis: Diagnoses of Rash and Herpes zoster without complication were pertinent to this visit.    Medical Information   Related to Industrial Injury? No    Subjective Complaints:  DOI: 10/31/2021    Shahid Jenkins is a 31 y.o. female who presents today for evaluation of possible bug bite.  Patient reports that she was on shift last night.  When she was try to take a nap in the middle the night, during her overnight shift, she woke up and noticed some itching and discomfort on the left side of her neck.  Patient notes that she has a few \"bumps\" on the left side of her neck and it itches/burns and sometimes hurts.  She tried applying a homeopathic topical ointment to the area.with itching which provided very minimal relief.  She has not any fever/chills, URI symptoms, chest pain, shortness of breath, or lightheadedness/dizziness.  Patient is unsure if she has had chickenpox or not in the past but does believe she is at least vaccinated for it.  Patient reports that she has had other bites during her work hours over the past few weeks but this time it feels very different.       Objective Findings: Lymphadenopathy:      Head:      Left side of head: Occipital adenopathy present.      Cervical: No cervical adenopathy.   Skin:     General: Skin is warm and dry.      Capillary Refill: Capillary refill takes less than 2 seconds.      Comments: Left lateral/posterior aspect of neck exhibits a few " erythematous macular/papular lesions and one pinpoint vesicular lesion noted in between them.  The areas are very mildly tender to palpation.  No signs of any secondary bacterial skin infection.  I do not see any foreign body or punctate center noted among the area of erythema.      Pre-Existing Condition(s):     Assessment:   Initial Visit    Status:    Permanent Disability:No    Plan: Medication    Diagnostics:      Comments:       Disability Information   Status: Released to Full Duty    From:  11/1/2021  Through: 11/5/2021 Restrictions are: Temporary   Physical Restrictions   Sitting:    Standing:    Stooping:    Bending:      Squatting:    Walking:    Climbing:    Pushing:      Pulling:    Other:    Reaching Above Shoulder (L):   Reaching Above Shoulder (R):       Reaching Below Shoulder (L):    Reaching Below Shoulder (R):      Not to exceed Weight Limits   Carrying(hrs):   Weight Limit(lb):   Lifting(hrs):   Weight  Limit(lb):     Comments: Patient is capable of full duty but must keep the rash completely covered while she is at work.  Return in 4 days for reevaluation.    Repetitive Actions   Hands: i.e. Fine Manipulations from Grasping:     Feet: i.e. Operating Foot Controls:     Driving / Operate Machinery:     Health Care Provider’s Original or Electronic Signature  Anabelle Carlton P.A.-C. Health Care Provider’s Original or Electronic Signature    Rehan Garber MD         Clinic Name / Location: [address]  [place] [address] Clinic Phone Number: Dept: [phone]   Appointment Time: 10:10 Am Visit Start Time: 10:23 AM   Check-In Time:  10:15 Am Visit Discharge Time:  11:07AM   Original-Treating Physician or Chiropractor    Page 2-Insurer/TPA    Page 3-Employer    Page 4-Employee

## 2021-11-01 NOTE — PROGRESS NOTES
"Subjective     Shahid Jenkins is a 31 y.o. female who presents with Work-Related Injury ( New bug bite on neck/[employer]/DOI 10/31/21, pt thinks they got bit by bug, lump on neck, swelling, itchiness. )    DOI: 10/31/2021    Shahid Jenkins is a 31 y.o. female who presents today for evaluation of possible bug bite.  Patient reports that she was on shift last night.  When she was try to take a nap in the middle the night, during her overnight shift, she woke up and noticed some itching and discomfort on the left side of her neck.  Patient notes that she has a few \"bumps\" on the left side of her neck and it itches/burns and sometimes hurts.  She tried applying a homeopathic topical ointment to the area.with itching which provided very minimal relief.  She has not any fever/chills, URI symptoms, chest pain, shortness of breath, or lightheadedness/dizziness.  Patient is unsure if she has had chickenpox or not in the past but does believe she is at least vaccinated for it.  Patient reports that she has had other bites during her work hours over the past few weeks but this time it feels very different.      Review of Systems   Constitutional: Negative for chills, fever and malaise/fatigue.   Gastrointestinal: Negative for nausea and vomiting.   Musculoskeletal: Negative for myalgias.   Skin: Positive for itching and rash.   Neurological: Negative for dizziness and headaches.       PMH: No pertinent past medical history to this problem  MEDS: Medications were reviewed in Epic  ALLERGIES: Allergies were reviewed in Epic  SOCHX: Works as a caregiver at [employer]   FH: No pertinent family history to this problem          Objective     /74 (BP Location: Right arm, Patient Position: Sitting, BP Cuff Size: Adult)   Pulse 64   Temp 36.6 °C (97.9 °F) (Temporal)   Resp 16   Ht 1.6 m (5' 3\")   Wt 75.9 kg (167 lb 6.4 oz)   SpO2 99%   BMI 29.65 kg/m²      Physical Exam  Constitutional:       " Appearance: She is well-developed.   HENT:      Head: Normocephalic and atraumatic.      Right Ear: External ear normal.      Left Ear: External ear normal.   Eyes:      Conjunctiva/sclera: Conjunctivae normal.      Pupils: Pupils are equal, round, and reactive to light.   Cardiovascular:      Rate and Rhythm: Normal rate and regular rhythm.      Heart sounds: Normal heart sounds. No murmur heard.     Pulmonary:      Effort: Pulmonary effort is normal.      Breath sounds: Normal breath sounds. No wheezing.   Musculoskeletal:      Cervical back: Normal range of motion.   Lymphadenopathy:      Head:      Left side of head: Occipital adenopathy present.      Cervical: No cervical adenopathy.   Skin:     General: Skin is warm and dry.      Capillary Refill: Capillary refill takes less than 2 seconds.      Comments: Left lateral/posterior aspect of neck exhibits a few erythematous macular/papular lesions and one pinpoint vesicular lesion noted in between them.  The areas are very mildly tender to palpation.  No signs of any secondary bacterial skin infection.  I do not see any foreign body or punctate center noted among the area of erythema.   Neurological:      Mental Status: She is alert and oriented to person, place, and time.   Psychiatric:         Behavior: Behavior normal.         Judgment: Judgment normal.           Assessment & Plan     1. Rash    2. Herpes zoster without complication  - famciclovir (FAMVIR) 500 MG Tab; Take 1 Tablet by mouth 3 times a day for 7 days.  Dispense: 21 Tablet; Refill: 0       Discussed with patient that there is no obvious sign of bug bite or infection noted.  Given the current clinical presentation and the one vesicular lesion that he see as well as the fact that the she is having some paresthesias inferior to the rash as well I believe that she might be experiencing a shingles flareup.  We will err on the side of caution and start her on famciclovir.  Patient was advised that she  can also apply cool compresses to the area and use OTC anti-itch cream.  Also recommend the use of an oral antihistamine.  Patient can continue on full duty during this time but should keep the rash completely covered while she is at work. Return in 4 days for reevaluation.  If it is infected to a bug bite or an allergic reaction the antihistamine and cool compresses can help with that as well.  She may also use OTC analgesics as needed for pain.

## 2021-11-01 NOTE — LETTER
"EMPLOYEE’S CLAIM FOR COMPENSATION/ REPORT OF INITIAL TREATMENT  FORM C-4    EMPLOYEE’S CLAIM - PROVIDE ALL INFORMATION REQUESTED   First Name  Shahid Last Name  Gregory Birthdate                    1990                Sex  female Claim Number (Insurer’s Use Only)    Home Address  [address] Age  31 y.o. 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 (if applicable)  Sitting on a chair    How did this injury or occupational disease occur? (Be specific an answer in detail. Use additional sheet if necessary)  When I was trying to take some nap I feel itchy and when I woke up I got bites already.   If you believe that you have an occupational disease, when did you first have knowledge of the disability and it relationship to your employment?  N/A Witnesses to the Accident  Iqra Ornelas Nina      Nature of Injury or Occupational Disease  Puncture  Part(s) of Body Injured or Affected  Soft Tissue - Neck, N/A, N/A    I certify that the above is true and correct to the best " of my knowledge and that I have provided this information in order to obtain the benefits of [place]  I hereby authorize any physician, chiropractor, surgeon, practitioner, or other person, any hospital, including [facility] or [facility], any medical service organization, any insurance company, or other institution or organization to release to each other, any medical or other information, including benefits paid or payable, pertinent to this injury or disease, except information relative to diagnosis, treatment and/or counseling for AIDS, psychological conditions, alcohol or controlled substances, for which I must give specific authorization.  A Photostat of this authorization shall be as valid as the original.     Date   Place Employee’s Original or  *Electronic Signature   THIS REPORT MUST BE COMPLETED AND MAILED WITHIN 3 WORKING DAYS OF TREATMENT   Place  [facility]  Name of Facility  [place]   Date  11/1/2021 Diagnosis and Description of Injury or Occupational Disease  (R21) Rash  (B02.9) Herpes zoster without complication Is there evidence the injured employee was under the influence of alcohol and/or another controlled substance at the time of accident?  ? No ? Yes (if yes, please explain)    Hour  10:23 AM   Diagnoses of Rash and Herpes zoster without complication were pertinent to this visit. No   Treatment  Mechelle with patient the possibility that this is a shingles flareup and she is being treated with famciclovir.  She is capable of full duty but must keep the rash completely covered while she is at work.  Have you advised the patient to remain off work five days or     more?    X-Ray Findings      ? Yes Indicate dates:   From   To      From information given by the employee, together with medical evidence, can        you directly connect this injury or  "occupational disease as job incurred?  No ? No If no, is the injured employee capable of:  ? full duty  Yes ? modified duty      Is additional medical care by a physician indicated?  Yes If Modified Duty, Specify any Limitations / Restrictions      Do you know of any previous injury or disease contributing to this condition or occupational disease?  ? Yes ? No (Explain if yes)                          No   Date  11/1/2021 Print Health Care Provider's   Audrey Carlton P.A.-C. I certify the employer’s copy of  this form was mailed on:   Address  [address] [place] [address]. [address] Insurer’s Use Only     [facility] Zip  [address]    Provider’s Tax ID Number  [id number] Telephone  Dept: [phone]             Health Care Provider’s Original or Electronic Signature  e-AUDREY Castillo P.A.-C. Degree (MD,, DC,DWAYNE,APRN)   PAFROILAN      * Complete and attach Release of Information (Form C-4A) when injured employee signs C-4 Form electronically  ORIGINAL - TREATING HEALTHCARE PROVIDER PAGE 2 - INSURER/TPA PAGE 3 - EMPLOYER PAGE 4 - EMPLOYEE             Form C-4 (rev.08/21)           BRIEF DESCRIPTION OF RIGHTS AND BENEFITS  (Pursuant to [place] 616[place])    Notice of Injury or Occupational Disease (Incident Report Form C-1): If an injury or occupational disease (OD) arises out of and in the course of employment, you must provide written notice to your employer as soon as practicable, but no later than 7 days after the accident or OD. Your employer shall maintain a sufficient supply of the required forms.    Claim for Compensation (Form C-4): If medical treatment is sought, the form C-4 is available at the place of initial treatment. A completed \"Claim for Compensation\" (Form C-4) must be filed within 90 days after an accident or OD. The treating physician or chiropractor must, within 3 working days after treatment, complete and mail to the employer, the employer's insurer and third-party , the Claim for " Compensation.    Medical Treatment: If you require medical treatment for your on-the-job injury or OD, you may be required to select a physician or chiropractor from a list provided by your workers’ compensation insurer, if it has contracted with an Organization for Managed Care (MCO) or Preferred Provider Organization (PPO) or providers of health care. If your employer has not entered into a contract with an MCO or PPO, you may select a physician or chiropractor from the Panel of Physicians and Chiropractors. Any medical costs related to your industrial injury or OD will be paid by your insurer.    Temporary Total Disability (TTD): If your doctor has certified that you are unable to work for a period of at least 5 consecutive days, or 5 cumulative days in a 20-day period, or places restrictions on you that your employer does not accommodate, you may be entitled to TTD compensation.    Temporary Partial Disability (TPD): If the wage you receive upon reemployment is less than the compensation for TTD to which you are entitled, the insurer may be required to pay you TPD compensation to make up the difference. TPD can only be paid for a maximum of 24 months.    Permanent Partial Disability (PPD): When your medical condition is stable and there is an indication of a PPD as a result of your injury or OD, within 30 days, your insurer must arrange for an evaluation by a rating physician or chiropractor to determine the degree of your PPD. The amount of your PPD award depends on the date of injury, the results of the PPD evaluation, your age and wage.    Permanent Total Disability (PTD): If you are medically certified by a treating physician or chiropractor as permanently and totally disabled and have been granted a PTD status by your insurer, you are entitled to receive monthly benefits not to exceed 66 2/3% of your average monthly wage. The amount of your PTD payments is subject to reduction if you previously received a  [facility]-sum PPD award.    Vocational Rehabilitation Services: You may be eligible for vocational rehabilitation services if you are unable to return to the job due to a permanent physical impairment or permanent restrictions as a result of your injury or occupational disease.    Transportation and Per Suzanna Reimbursement: You may be eligible for travel expenses and per suzanna associated with medical treatment.    Reopening: You may be able to reopen your claim if your condition worsens after claim closure.     Appeal Process: If you disagree with a written determination issued by the insurer or the insurer does not respond to your request, you may appeal to the Department of Administration, , by following the instructions contained in your determination letter. You must appeal the determination within 70 days from the date of the determination letter at [address], [address], [place], [place] [address], or [address], [address], [place], [place] [address]. If you disagree with the  decision, you may appeal to the Department of Administration, . You must file your appeal within 30 days from the date of the  decision letter at [address], [address], [place], [place] [address], or [address], [address], [place], [place] [address]. If you disagree with a decision of an , you may file a petition for judicial review with the District Court. You must do so within 30 days of the Appeal Officer’s decision. You may be represented by an  at your own expense or you may contact the [facility] for possible representation.    [place]  for Injured Workers (NAIW): If you disagree with a  decision, you may request that NAIW represent you without charge at an  Hearing. For information regarding denial of benefits, you may contact the [facility] at: [address], [address], [place]  [address], [phone], or [address], [place] [address], [phone]    To File a Complaint with the Division: If you wish to file a complaint with the  of the Division of Industrial Relations (DIR),  please contact the Workers’ Compensation Section, [address], [address], [place], [place] [address], telephone [phone], or [address], [address], [place], [place] [address], telephone [phone].    For assistance with Workers’ Compensation Issues: You may contact the [facility] Office for Consumer Health Assistance, [address], [address], [place], [place] [address], Toll Free 1-398.237.9041, Web site: http://[facility].nv.[facility]/Programs/GAGE E-mail: gage@[facility].[place].[facility]              __________________________________________________________________                                    _________________            Employee Name / Signature                                                                                                                            Date                                                                                                                                                                                                                              D-2 (rev. 10/20)